# Patient Record
Sex: MALE | Race: WHITE | Employment: FULL TIME | URBAN - NONMETROPOLITAN AREA
[De-identification: names, ages, dates, MRNs, and addresses within clinical notes are randomized per-mention and may not be internally consistent; named-entity substitution may affect disease eponyms.]

---

## 2022-09-29 ENCOUNTER — APPOINTMENT (OUTPATIENT)
Dept: OCCUPATIONAL MEDICINE | Facility: OTHER | Age: 27
End: 2022-09-29

## 2022-09-29 PROCEDURE — 99499 UNLISTED E&M SERVICE: CPT

## 2022-09-29 PROCEDURE — 99000 SPECIMEN HANDLING OFFICE-LAB: CPT

## 2022-09-29 PROCEDURE — 92499 UNLISTED OPH SVC/PROCEDURE: CPT

## 2022-09-29 PROCEDURE — 92552 PURE TONE AUDIOMETRY AIR: CPT

## 2023-11-16 ENCOUNTER — OFFICE VISIT (OUTPATIENT)
Dept: FAMILY MEDICINE | Facility: OTHER | Age: 28
End: 2023-11-16
Attending: STUDENT IN AN ORGANIZED HEALTH CARE EDUCATION/TRAINING PROGRAM
Payer: COMMERCIAL

## 2023-11-16 VITALS
HEART RATE: 56 BPM | DIASTOLIC BLOOD PRESSURE: 60 MMHG | BODY MASS INDEX: 26.51 KG/M2 | OXYGEN SATURATION: 96 % | HEIGHT: 69 IN | TEMPERATURE: 97.5 F | SYSTOLIC BLOOD PRESSURE: 115 MMHG | WEIGHT: 179 LBS

## 2023-11-16 DIAGNOSIS — Z00.00 LABORATORY EXAMINATION ORDERED AS PART OF A ROUTINE GENERAL MEDICAL EXAMINATION: ICD-10-CM

## 2023-11-16 DIAGNOSIS — N48.9 PENILE LESION: ICD-10-CM

## 2023-11-16 DIAGNOSIS — Z87.891 HISTORY OF TOBACCO USE DISORDER: Primary | ICD-10-CM

## 2023-11-16 DIAGNOSIS — Z13.1 SCREENING FOR DIABETES MELLITUS: ICD-10-CM

## 2023-11-16 DIAGNOSIS — L65.9 HAIR LOSS: ICD-10-CM

## 2023-11-16 DIAGNOSIS — Z13.220 SCREENING FOR LIPID DISORDERS: ICD-10-CM

## 2023-11-16 DIAGNOSIS — Z12.83 SKIN EXAM, SCREENING FOR CANCER: ICD-10-CM

## 2023-11-16 LAB
ALBUMIN SERPL BCG-MCNC: 4.8 G/DL (ref 3.5–5.2)
ALP SERPL-CCNC: 66 U/L (ref 40–150)
ALT SERPL W P-5'-P-CCNC: 18 U/L (ref 0–70)
ANION GAP SERPL CALCULATED.3IONS-SCNC: 11 MMOL/L (ref 7–15)
AST SERPL W P-5'-P-CCNC: 22 U/L (ref 0–45)
BASOPHILS # BLD AUTO: 0 10E3/UL (ref 0–0.2)
BASOPHILS NFR BLD AUTO: 1 %
BILIRUB SERPL-MCNC: 0.3 MG/DL
BUN SERPL-MCNC: 13.9 MG/DL (ref 6–20)
CALCIUM SERPL-MCNC: 9.8 MG/DL (ref 8.6–10)
CHLORIDE SERPL-SCNC: 104 MMOL/L (ref 98–107)
CHOLEST SERPL-MCNC: 198 MG/DL
CREAT SERPL-MCNC: 0.92 MG/DL (ref 0.67–1.17)
DEPRECATED HCO3 PLAS-SCNC: 25 MMOL/L (ref 22–29)
EGFRCR SERPLBLD CKD-EPI 2021: >90 ML/MIN/1.73M2
EOSINOPHIL # BLD AUTO: 0.1 10E3/UL (ref 0–0.7)
EOSINOPHIL NFR BLD AUTO: 1 %
ERYTHROCYTE [DISTWIDTH] IN BLOOD BY AUTOMATED COUNT: 12.4 % (ref 10–15)
EST. AVERAGE GLUCOSE BLD GHB EST-MCNC: 105 MG/DL
GLUCOSE SERPL-MCNC: 111 MG/DL (ref 70–99)
HBA1C MFR BLD: 5.3 %
HCT VFR BLD AUTO: 44 % (ref 40–53)
HDLC SERPL-MCNC: 39 MG/DL
HGB BLD-MCNC: 15.6 G/DL (ref 13.3–17.7)
IMM GRANULOCYTES # BLD: 0.1 10E3/UL
IMM GRANULOCYTES NFR BLD: 1 %
LDLC SERPL CALC-MCNC: 125 MG/DL
LYMPHOCYTES # BLD AUTO: 2.7 10E3/UL (ref 0.8–5.3)
LYMPHOCYTES NFR BLD AUTO: 34 %
MCH RBC QN AUTO: 31.1 PG (ref 26.5–33)
MCHC RBC AUTO-ENTMCNC: 35.5 G/DL (ref 31.5–36.5)
MCV RBC AUTO: 88 FL (ref 78–100)
MONOCYTES # BLD AUTO: 0.4 10E3/UL (ref 0–1.3)
MONOCYTES NFR BLD AUTO: 5 %
NEUTROPHILS # BLD AUTO: 4.6 10E3/UL (ref 1.6–8.3)
NEUTROPHILS NFR BLD AUTO: 58 %
NONHDLC SERPL-MCNC: 159 MG/DL
NRBC # BLD AUTO: 0 10E3/UL
NRBC BLD AUTO-RTO: 0 /100
PLATELET # BLD AUTO: 260 10E3/UL (ref 150–450)
POTASSIUM SERPL-SCNC: 4.1 MMOL/L (ref 3.4–5.3)
PROT SERPL-MCNC: 7.6 G/DL (ref 6.4–8.3)
RBC # BLD AUTO: 5.01 10E6/UL (ref 4.4–5.9)
SODIUM SERPL-SCNC: 140 MMOL/L (ref 135–145)
TRIGL SERPL-MCNC: 170 MG/DL
TSH SERPL DL<=0.005 MIU/L-ACNC: 1.46 UIU/ML (ref 0.3–4.2)
WBC # BLD AUTO: 7.9 10E3/UL (ref 4–11)

## 2023-11-16 PROCEDURE — 83036 HEMOGLOBIN GLYCOSYLATED A1C: CPT | Performed by: STUDENT IN AN ORGANIZED HEALTH CARE EDUCATION/TRAINING PROGRAM

## 2023-11-16 PROCEDURE — 80050 GENERAL HEALTH PANEL: CPT | Performed by: STUDENT IN AN ORGANIZED HEALTH CARE EDUCATION/TRAINING PROGRAM

## 2023-11-16 PROCEDURE — 80061 LIPID PANEL: CPT | Performed by: STUDENT IN AN ORGANIZED HEALTH CARE EDUCATION/TRAINING PROGRAM

## 2023-11-16 PROCEDURE — 99203 OFFICE O/P NEW LOW 30 MIN: CPT | Performed by: STUDENT IN AN ORGANIZED HEALTH CARE EDUCATION/TRAINING PROGRAM

## 2023-11-16 PROCEDURE — 36415 COLL VENOUS BLD VENIPUNCTURE: CPT | Performed by: STUDENT IN AN ORGANIZED HEALTH CARE EDUCATION/TRAINING PROGRAM

## 2023-11-16 ASSESSMENT — PAIN SCALES - GENERAL: PAINLEVEL: NO PAIN (0)

## 2023-11-16 NOTE — PROGRESS NOTES
Assessment & Plan     History of tobacco use disorder  Quit this month - hoping to get his health on track.   Congratulated on this.     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 TSH within normal limits today.   Requesting dermatology referral to discuss finasteride and minoxidil.   Referral to [facility] placed.   - TSH with free T4 reflex; Future  - Adult Dermatology  Referral; Future  - TSH with free T4 reflex    Skin exam, screening for cancer  Requesting full skin examination with dermatology.   Referral placed to [facility].   - Adult Dermatology  Referral; Future    Penile lesion  Small, hypopigmented central penile lesion without raised features, vasculature pattern, or atypical borders  Provided reassurance I do not think it is an STD or concerning finding.   Appears similar to possible small simon spot.   He is focused on HPV but I do not think it is a genital wart.   He wants to see a specialist and is requesting urology. Referral placed to [facility] per request.   - Adult Urology  Referral; Future    Screening for lipid disorders  - Lipid Profile; Future  - Lipid Profile    Laboratory examination ordered as part of a routine general medical examination  - CBC with Platelets & Differential; Future  - Comprehensive metabolic panel; Future  - Lipid Profile; Future  - TSH with free T4 reflex; Future  - CBC with Platelets & Differential  - Comprehensive metabolic panel  - Lipid Profile  - TSH with free T4 reflex    Screening for diabetes mellitus  - Hemoglobin A1c; Future  - Hemoglobin A1c      Theresa Clifford MD  [facility] - WENDI English is a 28 year old, presenting for the following health issues:  Establish Care      HPI   prior care in the [facility]. Works as an . [place]     Hoping for a complete physical.   Just quit smoking this month.   Wanting to have blood work done.   Hoping to see dermatology  "- hair loss and wants a skin check. Needs a specialist per report.   [facility]    Has skin tag he wants removed on thigh.   Wondering about stretch yonis on thigh. Could it be cancer vs varicose vein.     Has a small bump on penis - has been there 9 weeks. Not changing. Looks the same. Not painful. No pruritus. No new partner. Had partners, then had the STD testing. Very paranoid he has an STD. Very concerned about HPV. Has many photos/links on his phone of possible issues.         Objective    /60 (BP Location: Right arm, Patient Position: Sitting, Cuff Size: Adult Regular)   Pulse 56   Temp 97.5  F (36.4  C) (Tympanic)   Ht 1.753 m (5' 9\")   Wt 81.2 kg (179 lb)   SpO2 96%   BMI 26.43 kg/m    Body mass index is 26.43 kg/m .    Physical Exam  Constitutional:       General: He is not in acute distress.     Appearance: Normal appearance. He is not ill-appearing.   HENT:      Right Ear: Tympanic membrane and ear canal normal.      Left Ear: Tympanic membrane and ear canal normal.      Mouth/Throat:      Mouth: Mucous membranes are moist.      Pharynx: Oropharynx is clear.   Eyes:      Conjunctiva/sclera: Conjunctivae normal.   Neck:      Thyroid: No thyroid mass, thyromegaly or thyroid tenderness.   Cardiovascular:      Rate and Rhythm: Normal rate and regular rhythm.      Pulses: Normal pulses.      Heart sounds: No murmur heard.  Pulmonary:      Effort: Pulmonary effort is normal.      Breath sounds: Normal breath sounds.   Genitourinary:     Comments: Small hypopigmented patch that is difficult to appreciate on mid-penile shaft that is not raised or palpable on examination. There are no other skin lesions.   Has stretch markers on left inner thigh with 2 small (<2-3mm) fleshy appearing skin tags.  Musculoskeletal:      Cervical back: Normal range of motion and neck supple. No tenderness.      Right lower leg: No edema.      Left lower leg: No edema. "   Lymphadenopathy:      Cervical: No cervical adenopathy.   Skin:     General: Skin is warm and dry.      Capillary Refill: Capillary refill takes less than 2 seconds.   Neurological:      Mental Status: He is alert.   Psychiatric:         Mood and Affect: Mood is anxious.         Behavior: Behavior normal.

## 2023-11-16 NOTE — LETTER
November 17, 2023      Amador BRANDON Johanson  [address] [place] [address] [address] [place] [place] [address]        Dear Mr.Johanson,    We are writing to inform you of your test results.    Per provider. His cholesterol, triglycerides, and LDL (bad cholesterol) are high but with his age, he would not need a medicine at this time and it's something we can monitor yearly. A healthy diet and exercise can help improve the levels.   His white blood count (infection marker), hemoglobin (our red blood cells), electrolytes (sodium, potassium), kidney function (creatinine), and liver function (AST, ALT) are normal.   Thyroid function is normal.   Diabetes screen is negative/normal.     Please call us if you have any questions. [phone].     Resulted Orders   Comprehensive metabolic panel   Result Value Ref Range    Sodium 140 135 - 145 mmol/L      Comment:      Reference intervals for this test were updated on 09/26/2023 to more accurately reflect our healthy population. There may be differences in the flagging of prior results with similar values performed with this method. Interpretation of those prior results can be made in the context of the updated reference intervals.     Potassium 4.1 3.4 - 5.3 mmol/L    Carbon Dioxide (CO2) 25 22 - 29 mmol/L    Anion Gap 11 7 - 15 mmol/L    Urea Nitrogen 13.9 6.0 - 20.0 mg/dL    Creatinine 0.92 0.67 - 1.17 mg/dL    GFR Estimate >90 >60 mL/min/1.73m2    Calcium 9.8 8.6 - 10.0 mg/dL    Chloride 104 98 - 107 mmol/L    Glucose 111 (H) 70 - 99 mg/dL    Alkaline Phosphatase 66 40 - 150 U/L      Comment:      Reference intervals for this test were updated on 11/14/2023 to more accurately reflect our healthy population. There may be differences in the flagging of prior results with similar values performed with this method. Interpretation of those prior results can be made in the context of the updated reference intervals.    AST 22 0 - 45 U/L      Comment:      Reference intervals for this test were updated on  6/12/2023 to more accurately reflect our healthy population. There may be differences in the flagging of prior results with similar values performed with this method. Interpretation of those prior results can be made in the context of the updated reference intervals.    ALT 18 0 - 70 U/L      Comment:      Reference intervals for this test were updated on 6/12/2023 to more accurately reflect our healthy population. There may be differences in the flagging of prior results with similar values performed with this method. Interpretation of those prior results can be made in the context of the updated reference intervals.      Protein Total 7.6 6.4 - 8.3 g/dL    Albumin 4.8 3.5 - 5.2 g/dL    Bilirubin Total 0.3 <=1.2 mg/dL   Lipid Profile   Result Value Ref Range    Cholesterol 198 <200 mg/dL    Triglycerides 170 (H) <150 mg/dL    Direct Measure HDL 39 (L) >=40 mg/dL    LDL Cholesterol Calculated 125 (H) <=100 mg/dL    Non HDL Cholesterol 159 (H) <130 mg/dL    Narrative    Cholesterol  Desirable:  <200 mg/dL    Triglycerides  Normal:  Less than 150 mg/dL  Borderline High:  150-199 mg/dL  High:  200-499 mg/dL  Very High:  Greater than or equal to 500 mg/dL    Direct Measure HDL  Female:  Greater than or equal to 50 mg/dL   Male:  Greater than or equal to 40 mg/dL    LDL Cholesterol  Desirable:  <100mg/dL  Above Desirable:  100-129 mg/dL   Borderline High:  130-159 mg/dL   High:  160-189 mg/dL   Very High:  >= 190 mg/dL    Non HDL Cholesterol  Desirable:  130 mg/dL  Above Desirable:  130-159 mg/dL  Borderline High:  160-189 mg/dL  High:  190-219 mg/dL  Very High:  Greater than or equal to 220 mg/dL   TSH with free T4 reflex   Result Value Ref Range    TSH 1.46 0.30 - 4.20 uIU/mL   Hemoglobin A1c   Result Value Ref Range    Estimated Average Glucose 105 mg/dL    Hemoglobin A1C 5.3 <5.7 %      Comment:      Normal <5.7%   Prediabetes 5.7-6.4%    Diabetes 6.5% or higher     Note: Adopted from ADA consensus guidelines.   CBC  with platelets and differential   Result Value Ref Range    WBC Count 7.9 4.0 - 11.0 10e3/uL    RBC Count 5.01 4.40 - 5.90 10e6/uL    Hemoglobin 15.6 13.3 - 17.7 g/dL    Hematocrit 44.0 40.0 - 53.0 %    MCV 88 78 - 100 fL    MCH 31.1 26.5 - 33.0 pg    MCHC 35.5 31.5 - 36.5 g/dL    RDW 12.4 10.0 - 15.0 %    Platelet Count 260 150 - 450 10e3/uL    % Neutrophils 58 %    % Lymphocytes 34 %    % Monocytes 5 %    % Eosinophils 1 %    % Basophils 1 %    % Immature Granulocytes 1 %    NRBCs per 100 WBC 0 <1 /100    Absolute Neutrophils 4.6 1.6 - 8.3 10e3/uL    Absolute Lymphocytes 2.7 0.8 - 5.3 10e3/uL    Absolute Monocytes 0.4 0.0 - 1.3 10e3/uL    Absolute Eosinophils 0.1 0.0 - 0.7 10e3/uL    Absolute Basophils 0.0 0.0 - 0.2 10e3/uL    Absolute Immature Granulocytes 0.1 <=0.4 10e3/uL    Absolute NRBCs 0.0 10e3/uL       If you have any questions or concerns, please call the clinic at the number listed above.       Sincerely,      Theresa Clifford MD

## 2024-03-26 ENCOUNTER — TRANSFERRED RECORDS (OUTPATIENT)
Dept: HEALTH INFORMATION MANAGEMENT | Facility: CLINIC | Age: 29
End: 2024-03-26

## 2024-06-24 ENCOUNTER — OFFICE VISIT (OUTPATIENT)
Dept: FAMILY MEDICINE | Facility: OTHER | Age: 29
End: 2024-06-24
Attending: STUDENT IN AN ORGANIZED HEALTH CARE EDUCATION/TRAINING PROGRAM
Payer: COMMERCIAL

## 2024-06-24 VITALS
TEMPERATURE: 98.1 F | WEIGHT: 178.5 LBS | DIASTOLIC BLOOD PRESSURE: 81 MMHG | HEART RATE: 58 BPM | RESPIRATION RATE: 16 BRPM | OXYGEN SATURATION: 96 % | BODY MASS INDEX: 26.36 KG/M2 | SYSTOLIC BLOOD PRESSURE: 121 MMHG

## 2024-06-24 DIAGNOSIS — R09.81 NASAL CONGESTION: ICD-10-CM

## 2024-06-24 DIAGNOSIS — J32.9 CHRONIC SINUSITIS, UNSPECIFIED LOCATION: ICD-10-CM

## 2024-06-24 DIAGNOSIS — R06.83 SNORING: Primary | ICD-10-CM

## 2024-06-24 DIAGNOSIS — R43.0 LOSS OF SMELL: ICD-10-CM

## 2024-06-24 PROCEDURE — G2211 COMPLEX E/M VISIT ADD ON: HCPCS | Performed by: STUDENT IN AN ORGANIZED HEALTH CARE EDUCATION/TRAINING PROGRAM

## 2024-06-24 PROCEDURE — 99213 OFFICE O/P EST LOW 20 MIN: CPT | Performed by: STUDENT IN AN ORGANIZED HEALTH CARE EDUCATION/TRAINING PROGRAM

## 2024-06-24 RX ORDER — FLUTICASONE PROPIONATE 50 MCG
1 SPRAY, SUSPENSION (ML) NASAL DAILY
Qty: 18.2 ML | Refills: 0 | Status: SHIPPED | OUTPATIENT
Start: 2024-06-24

## 2024-06-24 ASSESSMENT — PAIN SCALES - GENERAL: PAINLEVEL: NO PAIN (0)

## 2024-06-24 NOTE — PATIENT INSTRUCTIONS
A referral was placed for you to see ENT and sleep medicine. If you do not hear to schedule in 4 business days, please reach out to our clinic at [phone] so we can look into it further.

## 2024-06-24 NOTE — PROGRESS NOTES
Assessment & Plan     Snoring  STOP-BANG of 4.  High risk for sleep apnea based on snoring, apnea, daytime fatigue.  Referred for sleep study.  - Adult Sleep Eval & Management  Referral; Future    Nasal congestion  Loss of smell  Chronic sinusitis, unspecified location  Ongoing many years.  History of allergy shots as a child.  Will plan CT sinuses and referral to ENT.  Possibly interested in surgical options.  Start Flonase in the interim.  - fluticasone (FLONASE) 50 MCG/ACT nasal spray; Spray 1 spray into both nostrils daily  - CT Sinus w/o Contrast; Future  - Adult ENT  Referral; Future        The longitudinal plan of care for the diagn end osis(es)/condition(s) as documented were addressed during this visit. Due to the added complexity in care, I will continue to support Amador in the subsequent management and with ongoing continuity of care.    Subjective   Amador is a 29 year old, presenting for the following health issues:  Sleep Apnea        6/24/2024     9:57 AM   Additional Questions   Roomed by Bj Flores   Accompanied by None         6/24/2024     9:57 AM   Patient Reported Additional Medications   Patient reports taking the following new medications minoxidil     History of Present Illness       Reason for visit:  Sleep Apnea    He eats 0-1 servings of fruits and vegetables daily.He consumes 0 sweetened beverage(s) daily.        Concern - Talk about sleep apnea   Onset: Chronic   Description: Patient thinks that he may have sleep apnea. He can not breathe through his nose, snores, wakes up tired after a full night of sleep and dry throat from mouth breathing   Intensity: mild, moderate  Progression of Symptoms:  worsening and constant  Accompanying Signs & Symptoms: Snoring, dry mouth and fatigued   Previous history of similar problem: N/A  Therapies tried and outcome: Mouth pieces- Non effective     Long history of allergies, used to do allergy shots  Wondering about surgery but  not sure if it's the right time  Facial pressure, drainage, loss of smell     Struggling to breathe through nose, leads to snoring, gets told he snores  Others do hear him having apnea  Still tired in the morning - doesn't matter how much sleep he's getting   Mom has sleep apnea     Snore (+1)  Daytime fatigue (+1)  Stop breathing (+1)  Hypertension (+1)  BMI >35 (+1)  Age >50 (+1)  Neck >40 (+1)  Gender male (+1)          Objective    /81 (BP Location: Left arm, Patient Position: Sitting, Cuff Size: Adult Regular)   Pulse 58   Temp 98.1  F (36.7  C) (Tympanic)   Resp 16   Wt 81 kg (178 lb 8 oz)   SpO2 96%   BMI 26.36 kg/m    Body mass index is 26.36 kg/m .  Physical Exam  Constitutional:       General: He is not in acute distress.     Appearance: Normal appearance. He is not ill-appearing.   HENT:      Head: Normocephalic and atraumatic.      Jaw: There is normal jaw occlusion.      Right Ear: Tympanic membrane and ear canal normal.      Left Ear: Tympanic membrane and ear canal normal.      Nose: Mucosal edema present. No nasal deformity or septal deviation.      Right Turbinates: Not swollen or pale.      Left Turbinates: Not swollen or pale.      Mouth/Throat:      Mouth: Mucous membranes are moist.      Pharynx: Oropharynx is clear.   Eyes:      Conjunctiva/sclera: Conjunctivae normal.      Pupils: Pupils are equal, round, and reactive to light.   Neck:      Thyroid: No thyroid mass or thyromegaly.   Cardiovascular:      Rate and Rhythm: Normal rate and regular rhythm.      Heart sounds: No murmur heard.  Pulmonary:      Effort: Pulmonary effort is normal.      Breath sounds: Normal breath sounds.   Lymphadenopathy:      Cervical: No cervical adenopathy.   Neurological:      General: No focal deficit present.      Mental Status: He is alert and oriented to person, place, and time.   Psychiatric:         Mood and Affect: Mood normal.         Behavior: Behavior normal.              Signed  Electronically by: Theresa Clifford MD

## 2024-06-25 ENCOUNTER — HOSPITAL ENCOUNTER (OUTPATIENT)
Dept: CT IMAGING | Facility: HOSPITAL | Age: 29
Discharge: HOME | End: 2024-06-25
Attending: STUDENT IN AN ORGANIZED HEALTH CARE EDUCATION/TRAINING PROGRAM | Admitting: STUDENT IN AN ORGANIZED HEALTH CARE EDUCATION/TRAINING PROGRAM
Payer: COMMERCIAL

## 2024-06-25 DIAGNOSIS — J32.9 CHRONIC SINUSITIS, UNSPECIFIED LOCATION: ICD-10-CM

## 2024-06-25 DIAGNOSIS — R09.81 NASAL CONGESTION: ICD-10-CM

## 2024-06-25 DIAGNOSIS — R43.0 LOSS OF SMELL: ICD-10-CM

## 2024-06-25 PROCEDURE — 70486 CT MAXILLOFACIAL W/O DYE: CPT

## 2024-07-28 ENCOUNTER — HEALTH MAINTENANCE LETTER (OUTPATIENT)
Age: 29
End: 2024-07-28

## 2024-08-21 NOTE — PROGRESS NOTES
Otolaryngology Consultation    Patient: Jacob J Johanson  : 1995    Patient presents with:  Nasal Congestion: Nasal congestion Loss of smell Chronic sinusitis, unspecified location.  Referred by, Dr. Theresa Clifford      HPI:  Jacob J Johanson is a 29 year old male seen today for chronic nasal obstruction.    He notes chronic nasal obstruction when supine.  He has difficulty sleeping due to nasal obstruction.    He has heroic snoring and associated daytime somnolence.    Medical management includes Flonase was not helpful.    No prior history of nasal trauma or prior nasal surgery    SNOT 49 with a severe problem with nasal blockage moderate problem with need to blow nose facial pressure and multiple sleep concerns.  Very mild decrease sense of smell    CT sinus dated 2024 shows clear sinuses throughout with a severe caudal septal deviation to the right.  The maxillary crest is lying at a 45 degree angle into the right nares with apparent prior fracture at the mid septum.  Large right spur in the mid septum.  Left worse than right turbinate hypertrophy the skull base is intact the lamina is intact the optic nerve is not dehiscent the nasopharynx is grossly clear      Sino-Nasal Outcome Test (SNOT - 22)    1. Need to Blow Nose: (P) Moderate  2. Nasal Blockage: (P) Severe  3. Sneezing: (P) Very mild  4. Runny Nose: (P) Very mild  5. Cough: (P) Very mild  6. Post-nasal discharge: (P) Moderate  7. Thick nasal discharge: (P) Mild or slight  8. Ear fullness: (P) Very mild  9. Dizziness: (P) None  10. Ear Pain: (P) Very mild  11. Facial pain/pressure: (P) Moderate  12. Decreased Sense of Smell/Taste: (P) Very mild  13. Difficulty falling asleep: (P) Severe  14. Wake up at night: (P) Moderate  15. Lack of a good night's sleep: (P) Bad as it can be  16. Wake up tired: (P) Bad as it can be  17. Fatigue: (P) Severe  18. Reduced Productivity: (P) Moderate;Severe  19. Reduced Concentration: (P) Moderate  20.  "Frustrated/restless/irritable: (P) Very mild;Mild or slight  21. Sad: (P) None;Very mild  22. Embarrassed: (P) None;Very mild    Total Score: (P) 49    COPYRIGHT 1996. [facility] [place], [place]      Current Outpatient Rx   Medication Sig Dispense Refill    FINASTERIDE-MINOXIDIL-TRETINOI EX Apply 1.25 % topically daily.      minoxidil (LONITEN) 2.5 MG tablet Take 2.5 mg by mouth daily.      fluticasone (FLONASE) 50 MCG/ACT nasal spray Spray 1 spray into both nostrils daily (Patient not taking: Reported on 2024) 18.2 mL 0       Allergies: Grass, Mold, Molds & smuts, and Trees     No past medical history on file.    No past surgical history on file.    ENT family history reviewed    Social History     Tobacco Use    Smoking status: Some Days     Current packs/day: 0.00     Average packs/day: 0.5 packs/day for 10.0 years (5.0 ttl pk-yrs)     Types: Cigarettes     Start date: 2013     Last attempt to quit: 2023     Years since quittin.8     Passive exposure: Past    Smokeless tobacco: Never   Vaping Use    Vaping status: Never Used   Substance Use Topics    Alcohol use: Yes     Comment: social    Drug use: Never       Review of Systems  ROS: 10 point ROS neg other than the symptoms noted above in the HPI and hair loss    Physical Exam  /69 (BP Location: Left arm, Patient Position: Sitting, Cuff Size: Adult Regular)   Pulse 64   Temp 97.9  F (36.6  C) (Tympanic)   Resp 18   Ht 1.778 m (5' 10\")   Wt 79.4 kg (175 lb)   SpO2 97%   BMI 25.11 kg/m    General - The patient is well nourished and well developed, and appears to have good nutritional status.  Alert and oriented to person and place, answers questions and cooperates with examination appropriately.   Head and Face - Normocephalic and atraumatic, with no gross asymmetry noted.  The facial nerve is intact, with strong symmetric movements.  Voice and Breathing - The patient was breathing comfortably without the use of " accessory muscles. There was no wheezing, stridor, or stertor.  The patients voice was clear and strong, and had appropriate pitch and quality.  No jon peripheral digital clubbing or cyanosis   Ears -The external auditory canals are patent, the tympanic membranes are intact without effusion, retraction or mass.  Bony landmarks are intact.  Eyes - Extraocular movements intact, and the pupils were reactive to light.  Sclera were not icteric or injected, conjunctiva were pink and moist.  Mouth - Examination of the oral cavity showed pink, healthy oral mucosa. No lesions or ulcerations noted.  The tongue was mobile and midline, and the dentition were in good condition.    Throat - The walls of the oropharynx were smooth, pink, moist, symmetric, and had no lesions or ulcerations.  The tonsillar pillars and soft palate were symmetric.  The uvula was midline on elevation.  Mclaughlin Palate Position III, macroglossia, grade 3 tonsils, thickened uvula  Neck - No palpable enlarged fixed cervical lymph nodes.  No neck cysts or unusual tenderness to palpation.   No palpable fixed thyroid nodules or concerning goiter.  The trachea is grossly midline.   Nose - External contour is symmetric, no gross deflection or scars.  Nasal mucosa is pink and moist with no abnormal mucus.      To evaluate the nose and sinuses, I performed rigid nasal endoscopy.  I applied topical nasal lidocaine and neosynephrine.    I began with the LEFT side using a 0 degree rigid nasal endoscope, and then similarly examined the RIGHT side    Findings:  Inferior turbinates:  4+ left  Septum deviated off of the maxillary crest and obstructing the right nares.  I am unable to advance the scope into the right nares.  Left:   Middle turbinate and middle meatus:  No purulence, no polyposis,   Superior meatus was evaluated  Frontal recess clear  Mucosa is healthy throughout,  no polyps nor polypoid degeneration  Sphenoethmoidal recess without purulence    Nasopharynx clear  The patient tolerated the procedure well        Impression and Plan- Jacob J Johanson is a 29 year old male with:    ICD-10-CM    1. Deviated nasal septum  J34.2       2. Nasal turbinate hypertrophy  J34.3       3. Sleep-disordered breathing  G47.30       4. 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 Hale for functional rhinoplasty.    CT, nasal anatomy and surgery discussed.    I recommend proceeding with a sleep study if he has persistent daytime somnolence after recovery from surgery.  This was also discussed.    In the interim he may continue Flonase use to the left nares but he has not found this helpful.      Jesi Guerra D.O.  Otolaryngology/Head and Neck Surgery  Allergy

## 2024-08-22 ENCOUNTER — OFFICE VISIT (OUTPATIENT)
Dept: OTOLARYNGOLOGY | Facility: OTHER | Age: 29
End: 2024-08-22
Attending: STUDENT IN AN ORGANIZED HEALTH CARE EDUCATION/TRAINING PROGRAM
Payer: COMMERCIAL

## 2024-08-22 VITALS
OXYGEN SATURATION: 97 % | SYSTOLIC BLOOD PRESSURE: 111 MMHG | HEART RATE: 64 BPM | RESPIRATION RATE: 18 BRPM | WEIGHT: 175 LBS | BODY MASS INDEX: 25.05 KG/M2 | TEMPERATURE: 97.9 F | DIASTOLIC BLOOD PRESSURE: 69 MMHG | HEIGHT: 70 IN

## 2024-08-22 DIAGNOSIS — G47.30 SLEEP-DISORDERED BREATHING: ICD-10-CM

## 2024-08-22 DIAGNOSIS — K13.79 HYPERTROPHIC SOFT PALATE: ICD-10-CM

## 2024-08-22 DIAGNOSIS — J34.3 NASAL TURBINATE HYPERTROPHY: ICD-10-CM

## 2024-08-22 DIAGNOSIS — J34.2 DEVIATED NASAL SEPTUM: Primary | ICD-10-CM

## 2024-08-22 PROCEDURE — 31231 NASAL ENDOSCOPY DX: CPT | Performed by: OTOLARYNGOLOGY

## 2024-08-22 PROCEDURE — 99203 OFFICE O/P NEW LOW 30 MIN: CPT | Mod: 25 | Performed by: OTOLARYNGOLOGY

## 2024-08-22 RX ORDER — MINOXIDIL 2.5 MG/1
2.5 TABLET ORAL DAILY
COMMUNITY
Start: 2024-05-24

## 2024-08-22 ASSESSMENT — PAIN SCALES - GENERAL: PAINLEVEL: NO PAIN (0)

## 2024-08-22 NOTE — LETTER
2024      Jacob J Johanson  [address] [place] [address] [address] [place] [address]      Dear Colleague,    Thank you for referring your patient, Jacob J Johanson, to the [facility]. Please see a copy of my visit note below.    Otolaryngology Consultation    Patient: Jacob J Johanson  : 1995    Patient presents with:  Nasal Congestion: Nasal congestion Loss of smell Chronic sinusitis, unspecified location.  Referred by, Dr. Theresa Clifford      HPI:  Jacob J Johanson is a 29 year old male seen today for chronic nasal obstruction.    He notes chronic nasal obstruction when supine.  He has difficulty sleeping due to nasal obstruction.    He has heroic snoring and associated daytime somnolence.    Medical management includes Flonase was not helpful.    No prior history of nasal trauma or prior nasal surgery    SNOT 49 with a severe problem with nasal blockage moderate problem with need to blow nose facial pressure and multiple sleep concerns.  Very mild decrease sense of smell    CT sinus dated 2024 shows clear sinuses throughout with a severe caudal septal deviation to the right.  The maxillary crest is lying at a 45 degree angle into the right nares with apparent prior fracture at the mid septum.  Large right spur in the mid septum.  Left worse than right turbinate hypertrophy the skull base is intact the lamina is intact the optic nerve is not dehiscent the nasopharynx is grossly clear      Sino-Nasal Outcome Test (SNOT - 22)    1. Need to Blow Nose: (P) Moderate  2. Nasal Blockage: (P) Severe  3. Sneezing: (P) Very mild  4. Runny Nose: (P) Very mild  5. Cough: (P) Very mild  6. Post-nasal discharge: (P) Moderate  7. Thick nasal discharge: (P) Mild or slight  8. Ear fullness: (P) Very mild  9. Dizziness: (P) None  10. Ear Pain: (P) Very mild  11. Facial pain/pressure: (P) Moderate  12. Decreased Sense of Smell/Taste: (P) Very mild  13. Difficulty falling asleep: (P) Severe  14. Wake up at  "night: (P) Moderate  15. Lack of a good night's sleep: (P) Bad as it can be  16. Wake up tired: (P) Bad as it can be  17. Fatigue: (P) Severe  18. Reduced Productivity: (P) Moderate;Severe  19. Reduced Concentration: (P) Moderate  20. Frustrated/restless/irritable: (P) Very mild;Mild or slight  21. Sad: (P) None;Very mild  22. Embarrassed: (P) None;Very mild    Total Score: (P) 49    COPYRIGHT 1996. [facility] [place], [place]      Current Outpatient Rx   Medication Sig Dispense Refill     FINASTERIDE-MINOXIDIL-TRETINOI EX Apply 1.25 % topically daily.       minoxidil (LONITEN) 2.5 MG tablet Take 2.5 mg by mouth daily.       fluticasone (FLONASE) 50 MCG/ACT nasal spray Spray 1 spray into both nostrils daily (Patient not taking: Reported on 2024) 18.2 mL 0       Allergies: Grass, Mold, Molds & smuts, and Trees     No past medical history on file.    No past surgical history on file.    ENT family history reviewed    Social History     Tobacco Use     Smoking status: Some Days     Current packs/day: 0.00     Average packs/day: 0.5 packs/day for 10.0 years (5.0 ttl pk-yrs)     Types: Cigarettes     Start date: 2013     Last attempt to quit: 2023     Years since quittin.8     Passive exposure: Past     Smokeless tobacco: Never   Vaping Use     Vaping status: Never Used   Substance Use Topics     Alcohol use: Yes     Comment: social     Drug use: Never       Review of Systems  ROS: 10 point ROS neg other than the symptoms noted above in the HPI and hair loss    Physical Exam  /69 (BP Location: Left arm, Patient Position: Sitting, Cuff Size: Adult Regular)   Pulse 64   Temp 97.9  F (36.6  C) (Tympanic)   Resp 18   Ht 1.778 m (5' 10\")   Wt 79.4 kg (175 lb)   SpO2 97%   BMI 25.11 kg/m    General - The patient is well nourished and well developed, and appears to have good nutritional status.  Alert and oriented to person and place, answers questions and cooperates with " examination appropriately.   Head and Face - Normocephalic and atraumatic, with no gross asymmetry noted.  The facial nerve is intact, with strong symmetric movements.  Voice and Breathing - The patient was breathing comfortably without the use of accessory muscles. There was no wheezing, stridor, or stertor.  The patients voice was clear and strong, and had appropriate pitch and quality.  No jon peripheral digital clubbing or cyanosis   Ears -The external auditory canals are patent, the tympanic membranes are intact without effusion, retraction or mass.  Bony landmarks are intact.  Eyes - Extraocular movements intact, and the pupils were reactive to light.  Sclera were not icteric or injected, conjunctiva were pink and moist.  Mouth - Examination of the oral cavity showed pink, healthy oral mucosa. No lesions or ulcerations noted.  The tongue was mobile and midline, and the dentition were in good condition.    Throat - The walls of the oropharynx were smooth, pink, moist, symmetric, and had no lesions or ulcerations.  The tonsillar pillars and soft palate were symmetric.  The uvula was midline on elevation.  Mclaughlin Palate Position III, macroglossia, grade 3 tonsils, thickened uvula  Neck - No palpable enlarged fixed cervical lymph nodes.  No neck cysts or unusual tenderness to palpation.   No palpable fixed thyroid nodules or concerning goiter.  The trachea is grossly midline.   Nose - External contour is symmetric, no gross deflection or scars.  Nasal mucosa is pink and moist with no abnormal mucus.      To evaluate the nose and sinuses, I performed rigid nasal endoscopy.  I applied topical nasal lidocaine and neosynephrine.    I began with the LEFT side using a 0 degree rigid nasal endoscope, and then similarly examined the RIGHT side    Findings:  Inferior turbinates:  4+ left  Septum deviated off of the maxillary crest and obstructing the right nares.  I am unable to advance the scope into the right  nares.  Left:   Middle turbinate and middle meatus:  No purulence, no polyposis,   Superior meatus was evaluated  Frontal recess clear  Mucosa is healthy throughout,  no polyps nor polypoid degeneration  Sphenoethmoidal recess without purulence   Nasopharynx clear  The patient tolerated the procedure well        Impression and Plan- Jacob J Johanson is a 29 year old male with:    ICD-10-CM    1. Deviated nasal septum  J34.2       2. Nasal turbinate hypertrophy  J34.3       3. Sleep-disordered breathing  G47.30       4. 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 Hale for functional rhinoplasty.    CT, nasal anatomy and surgery discussed.    I recommend proceeding with a sleep study if he has persistent daytime somnolence after recovery from surgery.  This was also discussed.    In the interim he may continue Flonase use to the left nares but he has not found this helpful.      Jesi Guerra D.O.  Otolaryngology/Head and Neck Surgery  Allergy      Again, thank you for allowing me to participate in the care of your patient.        Sincerely,        Jesi Guerra MD

## 2024-08-22 NOTE — PATIENT INSTRUCTIONS
Referral is being sent to Dr. Giana Hale at the [facility] for Functional Septorhinoplasty. If you do not hear from someone within 2 weeks, please call them at [phone]. If you have any problems with the referral, reach out to JOE Jacobo at [phone].   Follow-up here as needed  You can stop your Flonase, unless you want to use it just in the left side    Thank you for allowing Dr. Guerra and our ENT team to participate in your care.  If your medications are too expensive, please give the nurse a call.  We can possibly change this medication.  If you have a scheduling or an appointment question please contact our Health Unit Coordinator at their direct line [phone].   ALL nursing questions or concerns can be directed to your ENT nurse, Donnell, at: [phone]

## 2025-08-10 ENCOUNTER — HEALTH MAINTENANCE LETTER (OUTPATIENT)
Age: 30
End: 2025-08-10